# Patient Record
Sex: FEMALE | Race: OTHER
[De-identification: names, ages, dates, MRNs, and addresses within clinical notes are randomized per-mention and may not be internally consistent; named-entity substitution may affect disease eponyms.]

---

## 2019-01-14 ENCOUNTER — APPOINTMENT (OUTPATIENT)
Dept: HEMATOLOGY ONCOLOGY | Facility: CLINIC | Age: 69
End: 2019-01-14
Payer: MEDICARE

## 2019-01-14 VITALS
TEMPERATURE: 98.3 F | OXYGEN SATURATION: 97 % | RESPIRATION RATE: 18 BRPM | HEART RATE: 73 BPM | HEIGHT: 68 IN | WEIGHT: 181 LBS | SYSTOLIC BLOOD PRESSURE: 127 MMHG | DIASTOLIC BLOOD PRESSURE: 79 MMHG | BODY MASS INDEX: 27.43 KG/M2

## 2019-01-14 DIAGNOSIS — Z80.0 FAMILY HISTORY OF MALIGNANT NEOPLASM OF DIGESTIVE ORGANS: ICD-10-CM

## 2019-01-14 DIAGNOSIS — Z78.9 OTHER SPECIFIED HEALTH STATUS: ICD-10-CM

## 2019-01-14 DIAGNOSIS — Z86.018 PERSONAL HISTORY OF OTHER BENIGN NEOPLASM: ICD-10-CM

## 2019-01-14 DIAGNOSIS — Z86.79 PERSONAL HISTORY OF OTHER DISEASES OF THE CIRCULATORY SYSTEM: ICD-10-CM

## 2019-01-14 PROCEDURE — 99214 OFFICE O/P EST MOD 30 MIN: CPT

## 2019-01-14 NOTE — HISTORY OF PRESENT ILLNESS
[de-identified] : This is a very pleasant 68-year-old woman with  the below past medical history who was diagnosed with a right breast stage I infiltrating ductal carcinoma that was found to be ER/[place] positive and HER-2/melita negative. She is also been followed by me for a hyperferritinemia.  She has been on tamoxifen with a completion goal of May of 2021. She is here for office followup with labs. [de-identified] : She offers no new complaints of the time of this office visit. [0 - No Distress] : Distress Level: 0

## 2019-01-14 NOTE — ASSESSMENT
[FreeTextEntry1] : She continues to tolerate treatment with tamoxifen well with the goal N. date of May of 2021.\par I will obtain a CBC with a differential, CMP, and a CA 27.29 level today.\par I will also obtain an iron panel as well as a ferritin level. Her last ferritin was within goal.\par Her breast imaging continues to be ordered by her surgeon Dr. MAIN Pollack.\par I will continue to monitor her closely.\par Since her ferritin levels have remained within goal I will see her back in 3 months for followup of the above and laboratory testing.

## 2019-01-14 NOTE — REASON FOR VISIT
[Follow-Up Visit] : a follow-up [Spouse] : spouse [FreeTextEntry2] : breast cancer and hyperferritinemia.

## 2019-03-08 ENCOUNTER — RX RENEWAL (OUTPATIENT)
Age: 69
End: 2019-03-08

## 2019-04-15 ENCOUNTER — APPOINTMENT (OUTPATIENT)
Dept: HEMATOLOGY ONCOLOGY | Facility: CLINIC | Age: 69
End: 2019-04-15
Payer: MEDICARE

## 2019-04-15 VITALS
DIASTOLIC BLOOD PRESSURE: 69 MMHG | OXYGEN SATURATION: 96 % | HEIGHT: 68 IN | HEART RATE: 64 BPM | RESPIRATION RATE: 18 BRPM | BODY MASS INDEX: 26.52 KG/M2 | WEIGHT: 175 LBS | TEMPERATURE: 98.1 F | SYSTOLIC BLOOD PRESSURE: 137 MMHG

## 2019-04-15 PROCEDURE — 99214 OFFICE O/P EST MOD 30 MIN: CPT

## 2019-04-15 RX ORDER — PNV NO.95/FERROUS FUM/FOLIC AC 28MG-0.8MG
TABLET ORAL
Refills: 0 | Status: ACTIVE | COMMUNITY

## 2019-04-15 RX ORDER — OLMESARTAN MEDOXOMIL 20 MG/1
20 TABLET, FILM COATED ORAL
Refills: 0 | Status: ACTIVE | COMMUNITY

## 2019-04-15 RX ORDER — DIAZEPAM 10 MG/1
10 TABLET ORAL
Refills: 0 | Status: ACTIVE | COMMUNITY

## 2019-04-15 NOTE — ASSESSMENT
[FreeTextEntry1] : She continues to tolerate treatment with tamoxifen well with the goal end date of May of 2021.\par I will obtain a CBC with a differential, CMP, and a CA 27.29 level today.\par I will also obtain an iron panel as well as a ferritin level. Her last ferritin was within goal.\par Her breast imaging continues to be ordered by her surgeon Dr. MAIN Pollack.  Recent imaging on 3/15/19 was negative.\par I will continue to monitor her closely.\par Since her ferritin levels have remained within goal I will see her back in 3 months for followup of the above and laboratory testing.

## 2019-04-15 NOTE — HISTORY OF PRESENT ILLNESS
[0 - No Distress] : Distress Level: 0 [de-identified] : Ms Cruz is here today for a follow up visit. She offers no complaints today. [de-identified] : This is a very pleasant 68-year-old woman with  the below past medical history who was diagnosed with a right breast stage I infiltrating ductal carcinoma that was found to be ER/VT positive and HER-2/melita negative. She is also been followed by me for a hyperferritinemia.  She has been on tamoxifen with a completion goal of May of 2021. She is here for office followup with labs.

## 2019-04-29 ENCOUNTER — OTHER (OUTPATIENT)
Age: 69
End: 2019-04-29

## 2019-06-10 ENCOUNTER — RX RENEWAL (OUTPATIENT)
Age: 69
End: 2019-06-10

## 2019-06-10 ENCOUNTER — OTHER (OUTPATIENT)
Age: 69
End: 2019-06-10

## 2019-07-08 ENCOUNTER — APPOINTMENT (OUTPATIENT)
Dept: HEMATOLOGY ONCOLOGY | Facility: CLINIC | Age: 69
End: 2019-07-08
Payer: MEDICARE

## 2019-07-08 VITALS
HEIGHT: 68 IN | DIASTOLIC BLOOD PRESSURE: 67 MMHG | HEART RATE: 67 BPM | RESPIRATION RATE: 18 BRPM | OXYGEN SATURATION: 97 % | BODY MASS INDEX: 27.13 KG/M2 | SYSTOLIC BLOOD PRESSURE: 129 MMHG | TEMPERATURE: 98.5 F | WEIGHT: 179 LBS

## 2019-07-08 PROCEDURE — 99214 OFFICE O/P EST MOD 30 MIN: CPT

## 2019-07-08 NOTE — ASSESSMENT
[FreeTextEntry1] : She continues to tolerate treatment with tamoxifen well with the goal end date of May of 2021.\par I will obtain a CBC with a differential, CMP, and a CA 27.29 level today.\par I will also obtain an iron panel as well as a ferritin level.\par Her breast imaging continues to be ordered by her surgeon Dr. MAIN Pollack.  Recent imaging on 3/15/19 was negative.\par I will continue to monitor her closely.\par Since her ferritin levels have remained within goal I will see her back in 3 months for followup of the above and laboratory testing.

## 2019-07-08 NOTE — HISTORY OF PRESENT ILLNESS
[0 - No Distress] : Distress Level: 0 [de-identified] : This is a very pleasant 68-year-old woman with  the below past medical history who was diagnosed with a right breast stage I infiltrating ductal carcinoma that was found to be ER/MI positive and HER-2/melita negative. She is also been followed by me for a hyperferritinemia.  She has been on tamoxifen with a completion goal of May of 2021. She is here for office followup with labs. [de-identified] : Ms Cruz is here today for a follow up visit. She offers no complaints today.

## 2019-07-08 NOTE — REVIEW OF SYSTEMS
[Fatigue] : fatigue [Constipation] : constipation [Negative] : Gastrointestinal [FreeTextEntry7] : chronic [FreeTextEntry3] : Scheduled to see Dr. Ogden, Opthamalogy.

## 2019-07-12 ENCOUNTER — RESULT REVIEW (OUTPATIENT)
Age: 69
End: 2019-07-12

## 2019-07-12 ENCOUNTER — RESULT CHARGE (OUTPATIENT)
Age: 69
End: 2019-07-12

## 2019-08-30 ENCOUNTER — RX RENEWAL (OUTPATIENT)
Age: 69
End: 2019-08-30

## 2019-12-06 ENCOUNTER — RX RENEWAL (OUTPATIENT)
Age: 69
End: 2019-12-06

## 2019-12-31 ENCOUNTER — APPOINTMENT (OUTPATIENT)
Dept: HEMATOLOGY ONCOLOGY | Facility: CLINIC | Age: 69
End: 2019-12-31
Payer: MEDICARE

## 2019-12-31 VITALS
WEIGHT: 180 LBS | BODY MASS INDEX: 27.28 KG/M2 | HEIGHT: 68 IN | DIASTOLIC BLOOD PRESSURE: 75 MMHG | OXYGEN SATURATION: 96 % | RESPIRATION RATE: 18 BRPM | HEART RATE: 65 BPM | TEMPERATURE: 98.2 F | SYSTOLIC BLOOD PRESSURE: 127 MMHG

## 2019-12-31 PROCEDURE — 99214 OFFICE O/P EST MOD 30 MIN: CPT

## 2019-12-31 NOTE — HISTORY OF PRESENT ILLNESS
[0 - No Distress] : Distress Level: 0 [de-identified] : This is a very pleasant 69-year-old woman with  the below past medical history who was diagnosed with a right breast stage I infiltrating ductal carcinoma that was found to be ER/[place] positive and HER-2/melita negative. She is also been followed by me for a hyperferritinemia.  She has been on tamoxifen with a completion goal of May of 2021. She is here for office followup with labs after nearly a 6 month interval. [de-identified] : Ms Cruz is here today for a follow up visit. Mammogram and surgeon visit scheduled February.  PCP routine visit scheduled for Feb 2020 as well. She offers no complaints.

## 2019-12-31 NOTE — ASSESSMENT
[FreeTextEntry1] : She continues to tolerate treatment with tamoxifen well with the goal end date of May of 2021.\par I will obtain a CBC with a differential, CMP, and a CA 27.29 level today.\par I will also obtain an iron panel as well as a ferritin level.\par Her breast imaging continues to be ordered by her surgeon Dr. MAIN Pollack.  Recent imaging on 3/15/19 was negative.\par I will continue to monitor her closely.\par Since her ferritin levels have remained within goal I will see her back in 3 months for followup of the above and laboratory testing.  I have also stressed to her the importance of adhering to the prescribed followup schedule.

## 2019-12-31 NOTE — REVIEW OF SYSTEMS
[Fatigue] : fatigue [Constipation] : constipation [Negative] : Allergic/Immunologic [Anxiety] : anxiety [FreeTextEntry2] : due to stress per pt [FreeTextEntry7] : chronic

## 2020-01-20 NOTE — HISTORY OF PRESENT ILLNESS
[de-identified] : This is a very pleasant 69-year-old woman with  the below past medical history who was diagnosed with a right breast stage I infiltrating ductal carcinoma that was found to be ER/[place] positive and HER-2/melita negative. She is also been followed by me for a hyperferritinemia.  She has been on tamoxifen with a completion goal of May of 2021. She is here for office followup with labs after nearly a 6 month interval. [de-identified] : Ms Cruz is here today for a follow up visit. Mammogram and surgeon visit scheduled February.  PCP routine visit scheduled for Feb 2020 as well. She offers no complaints.  [0 - No Distress] : Distress Level: 0

## 2020-01-20 NOTE — REVIEW OF SYSTEMS
[Fatigue] : fatigue [Constipation] : constipation [Anxiety] : anxiety [Negative] : Allergic/Immunologic [FreeTextEntry2] : due to stress per pt [FreeTextEntry7] : chronic

## 2020-01-23 ENCOUNTER — APPOINTMENT (OUTPATIENT)
Dept: HEMATOLOGY ONCOLOGY | Facility: CLINIC | Age: 70
End: 2020-01-23
Payer: MEDICARE

## 2020-01-23 VITALS
SYSTOLIC BLOOD PRESSURE: 150 MMHG | HEART RATE: 65 BPM | OXYGEN SATURATION: 97 % | TEMPERATURE: 98.2 F | WEIGHT: 179 LBS | RESPIRATION RATE: 18 BRPM | DIASTOLIC BLOOD PRESSURE: 76 MMHG | BODY MASS INDEX: 27.13 KG/M2 | HEIGHT: 68 IN

## 2020-01-23 PROCEDURE — 99499A: CUSTOM | Mod: NC

## 2020-04-02 ENCOUNTER — APPOINTMENT (OUTPATIENT)
Dept: HEMATOLOGY ONCOLOGY | Facility: CLINIC | Age: 70
End: 2020-04-02

## 2020-05-07 ENCOUNTER — APPOINTMENT (OUTPATIENT)
Dept: HEMATOLOGY ONCOLOGY | Facility: CLINIC | Age: 70
End: 2020-05-07
Payer: MEDICARE

## 2020-05-14 ENCOUNTER — APPOINTMENT (OUTPATIENT)
Dept: HEMATOLOGY ONCOLOGY | Facility: CLINIC | Age: 70
End: 2020-05-14
Payer: MEDICARE

## 2020-05-14 VITALS
OXYGEN SATURATION: 96 % | HEIGHT: 68 IN | BODY MASS INDEX: 27.28 KG/M2 | WEIGHT: 180 LBS | RESPIRATION RATE: 18 BRPM | HEART RATE: 85 BPM | DIASTOLIC BLOOD PRESSURE: 78 MMHG | TEMPERATURE: 98.1 F | SYSTOLIC BLOOD PRESSURE: 146 MMHG

## 2020-05-14 DIAGNOSIS — Z00.00 ENCOUNTER FOR GENERAL ADULT MEDICAL EXAMINATION W/OUT ABNORMAL FINDINGS: ICD-10-CM

## 2020-05-14 PROCEDURE — 99214 OFFICE O/P EST MOD 30 MIN: CPT

## 2020-05-14 NOTE — ASSESSMENT
[FreeTextEntry1] : She continues to tolerate treatment with tamoxifen well with the goal end date of May of 2021.\par I will obtain a CBC with a differential, CMP, and a CA 27.29 level today.\par I will also obtain an iron panel as well as a ferritin level.  Phlebotomy today as she does not wish to come back to the office at a later date due to the COVID-19 pandemic.\par Her breast imaging continues to be ordered by her surgeon Dr. MAIN Pollack.  Recent imaging on 3/15/19 was negative.\par I will continue to monitor her closely.\par I will see her back in 3 months for followup of the above and laboratory testing.  I have also stressed to her the importance of adhering to the prescribed followup schedule.

## 2020-05-14 NOTE — HISTORY OF PRESENT ILLNESS
[de-identified] : This is a very pleasant 69-year-old woman with  the below past medical history who was diagnosed with a right breast stage I infiltrating ductal carcinoma that was found to be ER/MO positive and HER-2/melita negative. She is also been followed by me for a hyperferritinemia.  She has been on tamoxifen with a completion goal of May of 2021. She is here for office followup with labs. [0 - No Distress] : Distress Level: 0 [de-identified] : Here today for a follow up visit. Visit to PCP for routine exam, uneventful. Reports a fall 3 weeks ago, hit her head, no LOC, pain to her right knee improved, she called her PCP, Dr. Beach.

## 2020-05-14 NOTE — REVIEW OF SYSTEMS
[Constipation] : constipation [Anxiety] : anxiety [Negative] : Psychiatric [FreeTextEntry3] : cataracts removed  [FreeTextEntry7] : psyllium fiber and protein shakes prn per Dr. Beach

## 2020-08-27 ENCOUNTER — APPOINTMENT (OUTPATIENT)
Dept: HEMATOLOGY ONCOLOGY | Facility: CLINIC | Age: 70
End: 2020-08-27
Payer: MEDICARE

## 2020-08-27 VITALS
WEIGHT: 182.31 LBS | HEIGHT: 68 IN | TEMPERATURE: 98.4 F | SYSTOLIC BLOOD PRESSURE: 153 MMHG | RESPIRATION RATE: 18 BRPM | BODY MASS INDEX: 27.63 KG/M2 | OXYGEN SATURATION: 97 % | DIASTOLIC BLOOD PRESSURE: 95 MMHG | HEART RATE: 72 BPM

## 2020-08-27 PROCEDURE — 99214 OFFICE O/P EST MOD 30 MIN: CPT

## 2020-08-27 NOTE — HISTORY OF PRESENT ILLNESS
[0 - No Distress] : Distress Level: 0 [de-identified] : This is a very pleasant 69-year-old woman with  the below past medical history who was diagnosed with a right breast stage I infiltrating ductal carcinoma that was found to be ER/[place] positive and HER-2/melita negative. She is also been followed by me for a hyperferritinemia.  She has been on tamoxifen with a completion goal of May of 2021. She is here for office followup with labs. [de-identified] : Here today for a follow up visit.  Offers no new complaints of the time of his office visit.

## 2020-08-27 NOTE — REVIEW OF SYSTEMS
[Constipation] : constipation [Anxiety] : anxiety [Negative] : Allergic/Immunologic [FreeTextEntry3] : cataracts removed  [FreeTextEntry7] : psyllium fiber and protein shakes prn per Dr. Beach

## 2020-08-27 NOTE — ASSESSMENT
[FreeTextEntry1] : She continues to tolerate treatment with tamoxifen well with the goal end date of May of 2021.\par I will obtain a CBC with a differential, CMP, and a CA 27.29 level today.\par I will also obtain an iron panel as well as a ferritin level.  She does not wish to undergo a phlebotomy today and wishes to base further phlebotomy on the ferritin levels.  She knows to call the office next week to discuss the results.\par Her breast imaging continues to be ordered by her surgeon Dr. MAIN Pollack.  Recent imaging on 6/11/20 was negative.\par I will continue to monitor her closely.\par I will see her back in 2-3 months for followup of the above and laboratory testing.  I have also stressed to her the importance of adhering to the prescribed followup schedule.

## 2020-12-31 ENCOUNTER — APPOINTMENT (OUTPATIENT)
Dept: HEMATOLOGY ONCOLOGY | Facility: CLINIC | Age: 70
End: 2020-12-31
Payer: MEDICARE

## 2020-12-31 ENCOUNTER — NON-APPOINTMENT (OUTPATIENT)
Age: 70
End: 2020-12-31

## 2020-12-31 VITALS
HEIGHT: 68 IN | TEMPERATURE: 98.1 F | HEART RATE: 94 BPM | WEIGHT: 182 LBS | RESPIRATION RATE: 18 BRPM | SYSTOLIC BLOOD PRESSURE: 129 MMHG | BODY MASS INDEX: 27.58 KG/M2 | DIASTOLIC BLOOD PRESSURE: 78 MMHG | OXYGEN SATURATION: 95 %

## 2020-12-31 PROCEDURE — 99214 OFFICE O/P EST MOD 30 MIN: CPT

## 2020-12-31 NOTE — HISTORY OF PRESENT ILLNESS
[0 - No Distress] : Distress Level: 0 [de-identified] : This is a very pleasant 70-year-old woman with  the below past medical history who was diagnosed with a right breast stage I infiltrating ductal carcinoma that was found to be ER/[place] positive and HER-2/melita negative. She is also been followed by me for a hyperferritinemia.  She has been on tamoxifen with a completion goal of May of 2021. She is here for office followup with labs. [de-identified] : Here today for a follow up visit.  Offers no new complaints of the time of his office visit.  She was phlebotomized twice since the time of her last office visit.

## 2020-12-31 NOTE — ASSESSMENT
[FreeTextEntry1] : She continues to tolerate treatment with tamoxifen well with the goal end date of May of 2021.\par I will obtain a CBC with a differential, CMP, and a CA 27.29 level today.\par I will also obtain an iron panel as well as a ferritin level.  She does not wish to undergo a phlebotomy today and wishes to base further phlebotomy on the ferritin levels.  She knows to call the office next week to discuss the results.\par Her breast imaging continues to be ordered by her surgeon Dr. MAIN Pollack.  Recent imaging on 6/11/20 was negative.\par I will continue to monitor her closely.\par I will see her back in 3 months for followup of the above and laboratory testing.  I have also stressed to her the importance of adhering to the prescribed followup schedule.

## 2021-01-08 ENCOUNTER — NON-APPOINTMENT (OUTPATIENT)
Age: 71
End: 2021-01-08

## 2021-04-13 ENCOUNTER — APPOINTMENT (OUTPATIENT)
Dept: HEMATOLOGY ONCOLOGY | Facility: CLINIC | Age: 71
End: 2021-04-13
Payer: MEDICARE

## 2021-04-13 VITALS
TEMPERATURE: 99 F | BODY MASS INDEX: 27.58 KG/M2 | HEIGHT: 68 IN | HEART RATE: 77 BPM | RESPIRATION RATE: 18 BRPM | DIASTOLIC BLOOD PRESSURE: 75 MMHG | WEIGHT: 182 LBS | OXYGEN SATURATION: 97 % | SYSTOLIC BLOOD PRESSURE: 150 MMHG

## 2021-04-13 LAB
CANCER AG27-29 SERPL-ACNC: 17.4 U/ML
FERRITIN SERPL-MCNC: 68 NG/ML
IRON SATN MFR SERPL: 54 %
IRON SERPL-MCNC: 186 UG/DL
TIBC SERPL-MCNC: 344 UG/DL
UIBC SERPL-MCNC: 159 UG/DL

## 2021-04-13 PROCEDURE — 99214 OFFICE O/P EST MOD 30 MIN: CPT

## 2021-04-18 NOTE — HISTORY OF PRESENT ILLNESS
[0 - No Distress] : Distress Level: 0 [de-identified] : This is a very pleasant 70-year-old woman with  the below past medical history who was diagnosed with a right breast stage I infiltrating ductal carcinoma that was found to be ER/[place] positive and HER-2/melita negative. She is also been followed by me for a hyperferritinemia.  She has been on tamoxifen with a completion goal of May of 2021. She is here for office followup with labs. [de-identified] : Here today for a follow up visit.  Offers no new complaints of the time of his office visit.  She was phlebotomized twice since the time of her last office visit.

## 2021-04-26 ENCOUNTER — NON-APPOINTMENT (OUTPATIENT)
Age: 71
End: 2021-04-26

## 2021-08-05 ENCOUNTER — LABORATORY RESULT (OUTPATIENT)
Age: 71
End: 2021-08-05

## 2021-08-05 ENCOUNTER — APPOINTMENT (OUTPATIENT)
Dept: HEMATOLOGY ONCOLOGY | Facility: CLINIC | Age: 71
End: 2021-08-05
Payer: MEDICARE

## 2021-08-05 VITALS
SYSTOLIC BLOOD PRESSURE: 160 MMHG | TEMPERATURE: 97.5 F | BODY MASS INDEX: 28.19 KG/M2 | RESPIRATION RATE: 18 BRPM | OXYGEN SATURATION: 98 % | DIASTOLIC BLOOD PRESSURE: 90 MMHG | WEIGHT: 186 LBS | HEIGHT: 68 IN | HEART RATE: 85 BPM

## 2021-08-05 LAB
CEA SERPL-MCNC: 2.6 NG/ML
FERRITIN SERPL-MCNC: 30 NG/ML
IRON SATN MFR SERPL: 19 %
IRON SERPL-MCNC: 77 UG/DL
TIBC SERPL-MCNC: 410 UG/DL
UIBC SERPL-MCNC: 333 UG/DL

## 2021-08-05 PROCEDURE — 99214 OFFICE O/P EST MOD 30 MIN: CPT

## 2021-08-05 NOTE — ASSESSMENT
[FreeTextEntry1] : She continues to tolerate treatment with tamoxifen well with the goal end date of May of 2021.\par I will obtain a CBC with a differential, CMP, and a CA 27.29 level today.\par I will also obtain an iron panel as well as a ferritin level.  She does not wish to undergo a phlebotomy today and wishes to base further phlebotomy on the ferritin levels.  She knows to call the office next week to discuss the results.\par Her breast imaging continues to be ordered by her surgeon Dr. MAIN Pollack.  Recent imaging on 6/2021 was negative as reported by the patient.\par I will continue to monitor her closely.\par I will see her back in 3 months for followup of the above and laboratory testing.  I have also stressed to her the importance of adhering to the prescribed followup schedule.

## 2021-08-05 NOTE — HISTORY OF PRESENT ILLNESS
[de-identified] : This is a very pleasant 70-year-old woman with  the below past medical history who was diagnosed with a right breast stage I infiltrating ductal carcinoma that was found to be ER/AL positive and HER-2/melita negative. She is also been followed by me for a hyperferritinemia.  She has been on tamoxifen with a completion goal of May of 2021. She is here for office followup with labs. [de-identified] : Here today for a follow up visit.  Offers no new complaints of the time of his office visit.  She underwent right knee injections since the last visit. [0 - No Distress] : Distress Level: 0

## 2021-08-19 ENCOUNTER — APPOINTMENT (OUTPATIENT)
Dept: HEMATOLOGY ONCOLOGY | Facility: CLINIC | Age: 71
End: 2021-08-19

## 2021-08-27 ENCOUNTER — NON-APPOINTMENT (OUTPATIENT)
Age: 71
End: 2021-08-27

## 2021-09-01 ENCOUNTER — NON-APPOINTMENT (OUTPATIENT)
Age: 71
End: 2021-09-01

## 2021-10-28 ENCOUNTER — APPOINTMENT (OUTPATIENT)
Dept: HEMATOLOGY ONCOLOGY | Facility: CLINIC | Age: 71
End: 2021-10-28
Payer: MEDICARE

## 2021-10-28 VITALS
SYSTOLIC BLOOD PRESSURE: 160 MMHG | RESPIRATION RATE: 18 BRPM | HEIGHT: 68 IN | OXYGEN SATURATION: 96 % | BODY MASS INDEX: 28.34 KG/M2 | TEMPERATURE: 98.5 F | DIASTOLIC BLOOD PRESSURE: 78 MMHG | HEART RATE: 73 BPM | WEIGHT: 187 LBS

## 2021-10-28 PROCEDURE — 99214 OFFICE O/P EST MOD 30 MIN: CPT

## 2021-10-28 PROCEDURE — 36415 COLL VENOUS BLD VENIPUNCTURE: CPT

## 2021-10-28 RX ORDER — CELECOXIB 200 MG/1
200 CAPSULE ORAL
Refills: 0 | Status: ACTIVE | COMMUNITY
Start: 2021-10-28

## 2021-10-28 NOTE — ASSESSMENT
[FreeTextEntry1] : She continues to tolerate treatment with tamoxifen well with the goal end date of May of 2021.\par I will obtain a CBC with a differential, CMP, and a CA 27.29 level today.\par I will also obtain an iron panel as well as a ferritin level.  She does not wish to undergo a phlebotomy today and wishes to base further phlebotomy on the ferritin levels.  She knows to call the office next week to discuss the results.\par Her breast imaging continues to be ordered by her surgeon Dr. MAIN Pollack.  Recent imaging on 6/2021 was negative as reported by the patient.\par She will undergo phlebotomy today. Her labs were reviewed and are adequate for phlebotomy. She continues to be thrombocytopenic without bleeding or bruising. She also has a mild elevation of alk phos we will have her repeat it.\par Continue routine, age-appropriate, healthcare maintenance \par History of present illness, review of systems, physical exam and treatment plan reviewed with .\par Office visit in 12  weeks or prn for new or worsening symptoms

## 2021-10-28 NOTE — HISTORY OF PRESENT ILLNESS
[0 - No Distress] : Distress Level: 0 [de-identified] : This is a very pleasant 70-year-old woman with  the below past medical history who was diagnosed with a right breast stage I infiltrating ductal carcinoma that was found to be ER/SC positive and HER-2/melita negative. She is also been followed by me for a hyperferritinemia.  She has been on tamoxifen with a completion goal of May of 2021. She is here for office followup with labs. [de-identified] : She presents for follow-up of stage I breast cancer status post tamoxifen completed in May 2021 and hemochromatosis for phlebotomy today.  She continues to have bilateral knee pain especially on the right side after falling down steps and injuring it.  She goes to see Dr. Myers in orthopedic surgery and has had various cortisone shots and gel shots as well as PT.  She  denies rash, fever, infections, bleeding, bruising, nausea,vomiting,cough, SOB, chest pain, change in BM, headache or dizziness.

## 2021-10-28 NOTE — REVIEW OF SYSTEMS
[Constipation] : constipation [Anxiety] : anxiety [Negative] : Allergic/Immunologic [Joint Pain] : joint pain [FreeTextEntry3] : cataracts removed  [FreeTextEntry7] : psyllium fiber and protein shakes prn per Dr. Beach [FreeTextEntry9] : knees,

## 2021-12-16 ENCOUNTER — NON-APPOINTMENT (OUTPATIENT)
Age: 71
End: 2021-12-16

## 2022-01-27 ENCOUNTER — APPOINTMENT (OUTPATIENT)
Dept: HEMATOLOGY ONCOLOGY | Facility: CLINIC | Age: 72
End: 2022-01-27

## 2022-04-05 ENCOUNTER — APPOINTMENT (OUTPATIENT)
Dept: HEMATOLOGY ONCOLOGY | Facility: CLINIC | Age: 72
End: 2022-04-05

## 2022-04-05 VITALS
SYSTOLIC BLOOD PRESSURE: 150 MMHG | BODY MASS INDEX: 26.37 KG/M2 | TEMPERATURE: 98.4 F | HEART RATE: 90 BPM | WEIGHT: 174 LBS | HEIGHT: 68 IN | DIASTOLIC BLOOD PRESSURE: 81 MMHG | RESPIRATION RATE: 18 BRPM | OXYGEN SATURATION: 97 %

## 2022-04-05 PROCEDURE — 99214 OFFICE O/P EST MOD 30 MIN: CPT

## 2022-04-05 NOTE — REVIEW OF SYSTEMS
[FreeTextEntry3] : cataracts removed  [FreeTextEntry7] : psyllium fiber and protein shakes prn per Dr. Beach [FreeTextEntry9] : knees,

## 2022-04-05 NOTE — ASSESSMENT
[FreeTextEntry1] : She continues to tolerate treatment with tamoxifen well with the goal end date of May of 2021.\par I will obtain a CBC with a differential, CMP, and a CA 27.29 level today.\par I will also obtain an iron panel as well as a ferritin level.  She does not wish to undergo a phlebotomy today and wishes to base further phlebotomy on the ferritin levels.  She knows to call the office next week to discuss the results.\par Her breast imaging continues to be ordered by her surgeon Dr. MAIN Pollack.  Recent imaging on 6/2021 was negative as reported by the patient.\par She will undergo phlebotomy today. Her labs were reviewed and are adequate for phlebotomy. She continues to be thrombocytopenic without bleeding or bruising. She also has a mild elevation of alk phos we will have her repeat it.\par Continue routine, age-appropriate, healthcare maintenance \par Office visit in 12  weeks or prn for new or worsening symptoms

## 2022-04-05 NOTE — HISTORY OF PRESENT ILLNESS
[de-identified] : This is a very pleasant 70-year-old woman with  the below past medical history who was diagnosed with a right breast stage I infiltrating ductal carcinoma that was found to be ER/[place] positive and HER-2/melita negative. She is also been followed by me for a hyperferritinemia.  She has been on tamoxifen with a completion goal of May of 2021. She is here for office followup with labs. [de-identified] : She presents for follow-up of stage I breast cancer status post tamoxifen completed in May 2021 and hemochromatosis for phlebotomy today.  She continues to have bilateral knee pain especially on the right side after falling down steps and injuring it.  She goes to see Dr. Myers in orthopedic surgery and has had various cortisone shots and gel shots as well as PT.  She  denies rash, fever, infections, bleeding, bruising, nausea,vomiting,cough, SOB, chest pain, change in BM, headache or dizziness.

## 2022-04-08 ENCOUNTER — NON-APPOINTMENT (OUTPATIENT)
Age: 72
End: 2022-04-08

## 2022-06-28 ENCOUNTER — APPOINTMENT (OUTPATIENT)
Dept: HEMATOLOGY ONCOLOGY | Facility: CLINIC | Age: 72
End: 2022-06-28

## 2022-08-15 ENCOUNTER — RESULT REVIEW (OUTPATIENT)
Age: 72
End: 2022-08-15

## 2022-08-15 ENCOUNTER — LABORATORY RESULT (OUTPATIENT)
Age: 72
End: 2022-08-15

## 2022-08-15 ENCOUNTER — APPOINTMENT (OUTPATIENT)
Dept: HEMATOLOGY ONCOLOGY | Facility: CLINIC | Age: 72
End: 2022-08-15

## 2022-08-15 VITALS
BODY MASS INDEX: 25.01 KG/M2 | TEMPERATURE: 97.6 F | WEIGHT: 165 LBS | DIASTOLIC BLOOD PRESSURE: 74 MMHG | RESPIRATION RATE: 18 BRPM | SYSTOLIC BLOOD PRESSURE: 148 MMHG | HEIGHT: 68 IN | OXYGEN SATURATION: 96 % | HEART RATE: 74 BPM

## 2022-08-15 DIAGNOSIS — E83.110 HEREDITARY HEMOCHROMATOSIS: ICD-10-CM

## 2022-08-15 DIAGNOSIS — D72.819 DECREASED WHITE BLOOD CELL COUNT, UNSPECIFIED: ICD-10-CM

## 2022-08-15 DIAGNOSIS — E80.6 OTHER DISORDERS OF BILIRUBIN METABOLISM: ICD-10-CM

## 2022-08-15 LAB
25(OH)D3 SERPL-MCNC: 74.9 NG/ML
CANCER AG27-29 SERPL-ACNC: 17.6 U/ML
FERRITIN SERPL-MCNC: 52 NG/ML
IRON SATN MFR SERPL: 23 %
IRON SERPL-MCNC: 93 UG/DL
TIBC SERPL-MCNC: 406 UG/DL
UIBC SERPL-MCNC: 313 UG/DL

## 2022-08-15 PROCEDURE — 99214 OFFICE O/P EST MOD 30 MIN: CPT

## 2022-08-15 RX ORDER — OLMESARTAN MEDOXOMIL AND HYDROCHLOROTHIAZIDE 20; 12.5 MG/1; MG/1
20-12.5 TABLET ORAL
Qty: 90 | Refills: 0 | Status: ACTIVE | COMMUNITY
Start: 2022-07-13

## 2022-08-15 NOTE — HISTORY OF PRESENT ILLNESS
[0 - No Distress] : Distress Level: 0 [de-identified] : This is a very pleasant 71-year-old woman with  the below past medical history who was diagnosed with a right breast stage I infiltrating ductal carcinoma that was found to be ER/[place] positive and HER-2/melita negative. She is also been followed by me for a hyperferritinemia.  She has been on tamoxifen with a completion goal of May of 2021. She is here for office followup with labs. [de-identified] : She presents for follow-up of stage I breast cancer status post tamoxifen completed in May 2021 and hemochromatosis for phlebotomy last 10/21.   She continues to have bilateral knee pain especially on the right side after falling down steps and injuring it.  She goes to see Dr. Myers in orthopedic surgery and has had various cortisone shots and gel shots as well as PT but there was no improvement so she underwent right knee replacement. She was informed that it may take a year to heal.  She had multiple URIs over the summer from babysitting her grandchildren.  She underwent mammo and sonography on 8/1/22 with Dr. Pollack at [facility] which was negative.  She  denies rash, fever, infections, bleeding, bruising, nausea,vomiting,cough, SOB, chest pain, change in BM, headache or dizziness.

## 2022-08-15 NOTE — REVIEW OF SYSTEMS
[Constipation] : constipation [Joint Pain] : joint pain [Anxiety] : anxiety [Negative] : Allergic/Immunologic [FreeTextEntry3] : cataracts removed  [FreeTextEntry7] : psyllium fiber and protein shakes prn per Dr. Beach [FreeTextEntry9] : Right knee discomfort status post right knee replacement

## 2022-08-15 NOTE — ASSESSMENT
[FreeTextEntry1] : She continues to tolerate treatment with tamoxifen well with the goal end date of May of 2021.\par I will obtain a CBC with a differential, CMP, and a CA 27.29 level today.\par I will also obtain an iron panel as well as a ferritin level.  She does not wish to undergo a phlebotomy today and wishes to base further phlebotomy on the ferritin levels.  She knows to call the office next week to discuss the results.\par Her breast imaging continues to be ordered by her surgeon Dr. MAIN Pollack.  Recent imaging on 8/2022 was negative \par Reviewed available labs.  She continues to be mildly leukopenic and thrombocytopenic.  Her hemoglobin is 13.9.  We await iron profile results.  Her alk phos is mildly elevated and her tbibli is also mildly elevated.  Copy to PCP, GI.  \par Continue routine, age-appropriate, healthcare maintenance \par History of present illness, review of systems, physical exam and treatment plan reviewed with .\par Office visit in 12  weeks or prn for new or worsening symptoms

## 2022-08-15 NOTE — RESULTS/DATA
[FreeTextEntry1] : WBC 3.44.0\par Hemoglobin 13.9\par Hematocrit 41.3\par Platelet count 94,000\par Total bilirubin 1.5\par Alk phos 184\par Glucose 163

## 2022-08-15 NOTE — HISTORY OF PRESENT ILLNESS
[0 - No Distress] : Distress Level: 0 [de-identified] : This is a very pleasant 71-year-old woman with  the below past medical history who was diagnosed with a right breast stage I infiltrating ductal carcinoma that was found to be ER/IA positive and HER-2/melita negative. She is also been followed by me for a hyperferritinemia.  She has been on tamoxifen with a completion goal of May of 2021. She is here for office followup with labs. [de-identified] : She presents for follow-up of stage I breast cancer status post tamoxifen completed in May 2021 and hemochromatosis for phlebotomy last 10/21.   She continues to have bilateral knee pain especially on the right side after falling down steps and injuring it.  She goes to see Dr. Myers in orthopedic surgery and has had various cortisone shots and gel shots as well as PT but there was no improvement so she underwent right knee replacement. She was informed that it may take a year to heal.  She had multiple URIs over the summer from babysitting her grandchildren.  She underwent mammo and sonography on 8/1/22 with Dr. Pollack at [facility] which was negative.  She  denies rash, fever, infections, bleeding, bruising, nausea,vomiting,cough, SOB, chest pain, change in BM, headache or dizziness.

## 2022-08-18 PROBLEM — E80.6 HYPERBILIRUBINEMIA: Status: ACTIVE | Noted: 2022-08-18

## 2022-08-24 ENCOUNTER — NON-APPOINTMENT (OUTPATIENT)
Age: 72
End: 2022-08-24

## 2022-08-24 NOTE — HISTORY OF PRESENT ILLNESS
[de-identified] : This is a very pleasant 71-year-old woman with  the below past medical history who was diagnosed with a right breast stage I infiltrating ductal carcinoma that was found to be ER/ID positive and HER-2/melita negative. She is also been followed by me for a hyperferritinemia.  She has been on tamoxifen with a completion goal of May of 2021. She is here for office followup with labs. [de-identified] : She presents for follow-up of stage I breast cancer status post tamoxifen completed in May 2021 and hemochromatosis for phlebotomy last 10/21.   She continues to have bilateral knee pain especially on the right side after falling down steps and injuring it.  She goes to see Dr. Myers in orthopedic surgery and has had various cortisone shots and gel shots as well as PT but there was no improvement so she underwent right knee replacement. She was informed that it may take a year to heal.  She had multiple URIs over the summer from babysitting her grandchildren.  She underwent mammo and sonography on 8/1/22 with Dr. Pollack at [facility] which was negative.  She  denies rash, fever, infections, bleeding, bruising, nausea,vomiting,cough, SOB, chest pain, change in BM, headache or dizziness.

## 2022-08-24 NOTE — REVIEW OF SYSTEMS
[FreeTextEntry3] : cataracts removed  [FreeTextEntry7] : psyllium fiber and protein shakes prn per Dr. Beach [FreeTextEntry9] : Right knee discomfort status post right knee replacement

## 2022-08-30 DIAGNOSIS — C50.911 MALIGNANT NEOPLASM OF UNSPECIFIED SITE OF RIGHT FEMALE BREAST: ICD-10-CM

## 2022-08-30 DIAGNOSIS — D69.6 THROMBOCYTOPENIA, UNSPECIFIED: ICD-10-CM

## 2022-08-31 ENCOUNTER — RESULT REVIEW (OUTPATIENT)
Age: 72
End: 2022-08-31

## 2022-08-31 ENCOUNTER — APPOINTMENT (OUTPATIENT)
Dept: HEMATOLOGY ONCOLOGY | Facility: CLINIC | Age: 72
End: 2022-08-31

## 2022-08-31 VITALS
HEART RATE: 76 BPM | BODY MASS INDEX: 25.31 KG/M2 | TEMPERATURE: 98.4 F | WEIGHT: 167 LBS | SYSTOLIC BLOOD PRESSURE: 149 MMHG | OXYGEN SATURATION: 97 % | RESPIRATION RATE: 18 BRPM | DIASTOLIC BLOOD PRESSURE: 74 MMHG | HEIGHT: 68 IN

## 2022-09-03 LAB
CANCER AG27-29 SERPL-ACNC: 15.9 U/ML
FERRITIN SERPL-MCNC: 39 NG/ML
FERRITIN SERPL-MCNC: 45 NG/ML
FERRITIN SERPL-MCNC: 84 NG/ML
IRON SATN MFR SERPL: 16 %
IRON SATN MFR SERPL: 30 %
IRON SATN MFR SERPL: 56 %
IRON SERPL-MCNC: 112 UG/DL
IRON SERPL-MCNC: 209 UG/DL
IRON SERPL-MCNC: 63 UG/DL
TIBC SERPL-MCNC: 373 UG/DL
TIBC SERPL-MCNC: 376 UG/DL
TIBC SERPL-MCNC: 395 UG/DL
UIBC SERPL-MCNC: 167 UG/DL
UIBC SERPL-MCNC: 262 UG/DL
UIBC SERPL-MCNC: 332 UG/DL

## 2022-09-19 ENCOUNTER — NON-APPOINTMENT (OUTPATIENT)
Age: 72
End: 2022-09-19

## 2022-11-15 ENCOUNTER — APPOINTMENT (OUTPATIENT)
Dept: HEMATOLOGY ONCOLOGY | Facility: CLINIC | Age: 72
End: 2022-11-15

## 2025-03-03 NOTE — REASON FOR VISIT
[Follow-Up Visit] : a follow-up [FreeTextEntry2] : BRCA and FITZ. Attending MD Manzo: I was present during the key portions of the procedure.